# Patient Record
Sex: MALE | Race: WHITE | NOT HISPANIC OR LATINO | Employment: UNEMPLOYED | URBAN - METROPOLITAN AREA
[De-identification: names, ages, dates, MRNs, and addresses within clinical notes are randomized per-mention and may not be internally consistent; named-entity substitution may affect disease eponyms.]

---
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
## 2023-06-14 ENCOUNTER — HOSPITAL ENCOUNTER (EMERGENCY)
Facility: HOSPITAL | Age: 18
Discharge: HOME | End: 2023-06-14
Attending: EMERGENCY MEDICINE
Payer: OTHER MISCELLANEOUS

## 2023-06-14 VITALS
RESPIRATION RATE: 18 BRPM | HEART RATE: 114 BPM | OXYGEN SATURATION: 97 % | SYSTOLIC BLOOD PRESSURE: 144 MMHG | TEMPERATURE: 99 F | DIASTOLIC BLOOD PRESSURE: 69 MMHG

## 2023-06-14 DIAGNOSIS — S69.90XA FINGER INJURY: Primary | ICD-10-CM

## 2023-06-14 DIAGNOSIS — Z23 TETANUS TOXOID VACCINATION ADMINISTERED AT CURRENT VISIT: ICD-10-CM

## 2023-06-14 PROCEDURE — 93005 ELECTROCARDIOGRAM TRACING: CPT

## 2023-06-14 PROCEDURE — 99283 EMERGENCY DEPT VISIT LOW MDM: CPT

## 2023-06-14 PROCEDURE — 99284 EMERGENCY DEPT VISIT MOD MDM: CPT | Performed by: EMERGENCY MEDICINE

## 2023-06-14 PROCEDURE — 90471 IMMUNIZATION ADMIN: CPT

## 2023-06-14 PROCEDURE — 90715 TDAP VACCINE 7 YRS/> IM: CPT

## 2023-06-14 RX ADMIN — TETANUS TOXOID, REDUCED DIPHTHERIA TOXOID AND ACELLULAR PERTUSSIS VACCINE, ADSORBED 0.5 ML: 5; 2.5; 8; 8; 2.5 SUSPENSION INTRAMUSCULAR at 23:30

## 2023-06-15 LAB
ATRIAL RATE: 96 BPM
P AXIS: 28 DEGREES
PR INTERVAL: 164 MS
QRS AXIS: 19 DEGREES
QRSD INTERVAL: 106 MS
QT INTERVAL: 356 MS
QTC INTERVAL: 449 MS
T WAVE AXIS: 12 DEGREES
VENTRICULAR RATE: 96 BPM

## 2023-06-15 PROCEDURE — 93010 ELECTROCARDIOGRAM REPORT: CPT | Performed by: INTERNAL MEDICINE

## 2023-06-15 NOTE — ED ATTENDING ATTESTATION
6/14/2023  ISukhwinder MD, saw and evaluated the patient  I have discussed the patient with the resident/non-physician practitioner and agree with the resident's/non-physician practitioner's findings, Plan of Care, and MDM as documented in the resident's/non-physician practitioner's note, except where noted  All available labs and Radiology studies were reviewed  I was present for key portions of any procedure(s) performed by the resident/non-physician practitioner and I was immediately available to provide assistance  At this point I agree with the current assessment done in the Emergency Department  I have conducted an independent evaluation of this patient a history and physical is as follows:    ED Course     20-year-old male presenting to the emergency department for evaluation of 2 concerns  Patient states that he works at a country [employer], got an abrasion to his right middle finger proximal interphalangeal joint, dorsal surface, and then was using water that was mary that got inside of gloves that he was wearing soiling the wound  Patient states that he does not know when he had his last tetanus shot so he became concerned  Patient states that shortly after this he started to experience palpitations  Patient feels an accelerated heart rate  Additionally the patient complains of a stiff jaw  On examination the patient appears slightly anxious  Head is normocephalic and atraumatic  Jaw is normal in external appearance  There is no intraoral lesions  There is no clicking  The patient has no trismus  There is no masses appreciated  Neck is supple  Submental space is soft  Heart is tachycardic and regular  There are no murmurs rubs or gallops  Lungs are clear bilaterally  Examination of the right hand reveals a very superficial abrasion to the right middle finger proximal interphalangeal joint over the dorsal surface  There is no active bleeding      Patient will be administered tetanus vaccination, EKG for complaints of palpitations, however I think the patient is in a sinus tachycardia secondary to anxiety  My EKG interpretation: NSR at 96 bpm   Poor R wave progression    No WPW, Brugada, or prolonged QT    Critical Care Time  Procedures

## 2023-06-15 NOTE — DISCHARGE INSTRUCTIONS
You were seen in the ED for finger injury and palpitations  Return to the ED for any worsening symptoms or new symptoms  Follow up with your primary care doctor as soon as possible